# Patient Record
Sex: MALE | Race: WHITE | NOT HISPANIC OR LATINO | URBAN - METROPOLITAN AREA
[De-identification: names, ages, dates, MRNs, and addresses within clinical notes are randomized per-mention and may not be internally consistent; named-entity substitution may affect disease eponyms.]

---

## 2018-10-02 ENCOUNTER — EMERGENCY (EMERGENCY)
Facility: HOSPITAL | Age: 48
LOS: 1 days | Discharge: HOME | End: 2018-10-02
Attending: EMERGENCY MEDICINE
Payer: COMMERCIAL

## 2018-10-02 VITALS
RESPIRATION RATE: 16 BRPM | SYSTOLIC BLOOD PRESSURE: 122 MMHG | TEMPERATURE: 98 F | OXYGEN SATURATION: 96 % | DIASTOLIC BLOOD PRESSURE: 87 MMHG | HEART RATE: 103 BPM

## 2018-10-02 VITALS — WEIGHT: 240.08 LBS | HEIGHT: 71 IN

## 2018-10-02 LAB
ALBUMIN SERPL ELPH-MCNC: 4.1 G/DL — SIGNIFICANT CHANGE UP (ref 3.3–5.2)
ALP SERPL-CCNC: 130 U/L — HIGH (ref 40–120)
ALT FLD-CCNC: 15 U/L — SIGNIFICANT CHANGE UP
ANION GAP SERPL CALC-SCNC: 15 MMOL/L — SIGNIFICANT CHANGE UP (ref 5–17)
AST SERPL-CCNC: 13 U/L — SIGNIFICANT CHANGE UP
BASOPHILS # BLD AUTO: 0.1 K/UL — SIGNIFICANT CHANGE UP (ref 0–0.2)
BASOPHILS NFR BLD AUTO: 0.5 % — SIGNIFICANT CHANGE UP (ref 0–2)
BILIRUB SERPL-MCNC: 0.5 MG/DL — SIGNIFICANT CHANGE UP (ref 0.4–2)
BUN SERPL-MCNC: 14 MG/DL — SIGNIFICANT CHANGE UP (ref 8–20)
CALCIUM SERPL-MCNC: 10.7 MG/DL — HIGH (ref 8.6–10.2)
CHLORIDE SERPL-SCNC: 97 MMOL/L — LOW (ref 98–107)
CO2 SERPL-SCNC: 22 MMOL/L — SIGNIFICANT CHANGE UP (ref 22–29)
CREAT SERPL-MCNC: 0.75 MG/DL — SIGNIFICANT CHANGE UP (ref 0.5–1.3)
EOSINOPHIL # BLD AUTO: 1.3 K/UL — HIGH (ref 0–0.5)
EOSINOPHIL NFR BLD AUTO: 10.8 % — HIGH (ref 0–5)
GLUCOSE SERPL-MCNC: 350 MG/DL — HIGH (ref 70–115)
HCT VFR BLD CALC: 48.6 % — SIGNIFICANT CHANGE UP (ref 42–52)
HGB BLD-MCNC: 16 G/DL — SIGNIFICANT CHANGE UP (ref 14–18)
LYMPHOCYTES # BLD AUTO: 1.6 K/UL — SIGNIFICANT CHANGE UP (ref 1–4.8)
LYMPHOCYTES # BLD AUTO: 13.8 % — LOW (ref 20–55)
MCHC RBC-ENTMCNC: 27.1 PG — SIGNIFICANT CHANGE UP (ref 27–31)
MCHC RBC-ENTMCNC: 32.9 G/DL — SIGNIFICANT CHANGE UP (ref 32–36)
MCV RBC AUTO: 82.2 FL — SIGNIFICANT CHANGE UP (ref 80–94)
MONOCYTES # BLD AUTO: 0.9 K/UL — HIGH (ref 0–0.8)
MONOCYTES NFR BLD AUTO: 7.9 % — SIGNIFICANT CHANGE UP (ref 3–10)
NEUTROPHILS # BLD AUTO: 7.9 K/UL — SIGNIFICANT CHANGE UP (ref 1.8–8)
NEUTROPHILS NFR BLD AUTO: 66.7 % — SIGNIFICANT CHANGE UP (ref 37–73)
PLATELET # BLD AUTO: 205 K/UL — SIGNIFICANT CHANGE UP (ref 150–400)
POTASSIUM SERPL-MCNC: 4.4 MMOL/L — SIGNIFICANT CHANGE UP (ref 3.5–5.3)
POTASSIUM SERPL-SCNC: 4.4 MMOL/L — SIGNIFICANT CHANGE UP (ref 3.5–5.3)
PROCALCITONIN SERPL-MCNC: <0.05 NG/ML — SIGNIFICANT CHANGE UP (ref 0–0.04)
PROT SERPL-MCNC: 8.1 G/DL — SIGNIFICANT CHANGE UP (ref 6.6–8.7)
RBC # BLD: 5.91 M/UL — SIGNIFICANT CHANGE UP (ref 4.6–6.2)
RBC # FLD: 12.6 % — SIGNIFICANT CHANGE UP (ref 11–15.6)
SODIUM SERPL-SCNC: 134 MMOL/L — LOW (ref 135–145)
WBC # BLD: 11.9 K/UL — HIGH (ref 4.8–10.8)
WBC # FLD AUTO: 11.9 K/UL — HIGH (ref 4.8–10.8)

## 2018-10-02 PROCEDURE — 99285 EMERGENCY DEPT VISIT HI MDM: CPT

## 2018-10-02 PROCEDURE — 94640 AIRWAY INHALATION TREATMENT: CPT

## 2018-10-02 PROCEDURE — 84145 PROCALCITONIN (PCT): CPT

## 2018-10-02 PROCEDURE — 71046 X-RAY EXAM CHEST 2 VIEWS: CPT | Mod: 26

## 2018-10-02 PROCEDURE — 96374 THER/PROPH/DIAG INJ IV PUSH: CPT

## 2018-10-02 PROCEDURE — 80053 COMPREHEN METABOLIC PANEL: CPT

## 2018-10-02 PROCEDURE — 85027 COMPLETE CBC AUTOMATED: CPT

## 2018-10-02 PROCEDURE — 96375 TX/PRO/DX INJ NEW DRUG ADDON: CPT

## 2018-10-02 PROCEDURE — 71046 X-RAY EXAM CHEST 2 VIEWS: CPT

## 2018-10-02 PROCEDURE — 99284 EMERGENCY DEPT VISIT MOD MDM: CPT | Mod: 25

## 2018-10-02 PROCEDURE — 36415 COLL VENOUS BLD VENIPUNCTURE: CPT

## 2018-10-02 RX ORDER — ALBUTEROL 90 UG/1
2.5 AEROSOL, METERED ORAL
Qty: 0 | Refills: 0 | Status: DISCONTINUED | OUTPATIENT
Start: 2018-10-02 | End: 2018-10-07

## 2018-10-02 RX ORDER — CEFTRIAXONE 500 MG/1
1 INJECTION, POWDER, FOR SOLUTION INTRAMUSCULAR; INTRAVENOUS ONCE
Qty: 0 | Refills: 0 | Status: COMPLETED | OUTPATIENT
Start: 2018-10-02 | End: 2018-10-02

## 2018-10-02 RX ADMIN — CEFTRIAXONE 1 GRAM(S): 500 INJECTION, POWDER, FOR SOLUTION INTRAMUSCULAR; INTRAVENOUS at 17:30

## 2018-10-02 RX ADMIN — ALBUTEROL 2.5 MILLIGRAM(S): 90 AEROSOL, METERED ORAL at 17:51

## 2018-10-02 RX ADMIN — CEFTRIAXONE 100 GRAM(S): 500 INJECTION, POWDER, FOR SOLUTION INTRAMUSCULAR; INTRAVENOUS at 17:41

## 2018-10-02 RX ADMIN — Medication 125 MILLIGRAM(S): at 17:40

## 2018-10-02 RX ADMIN — ALBUTEROL 2.5 MILLIGRAM(S): 90 AEROSOL, METERED ORAL at 17:40

## 2018-10-02 NOTE — ED STATDOCS - PROGRESS NOTE DETAILS
PT evaluated by intake physician. HPI/PE/ROS as noted above. Will follow up plan per intake physician. results reviewed with Pt. rx steroids and augmentin. + pneumonia on cxr. Pt stable in ED, Pt states he "feels better." PT verbalized understanding of diagnosis and importance of follow up at [facility]. PT educated on importance of follow up and when to return to the ED.

## 2018-10-02 NOTE — ED STATDOCS - CARE PLAN
Principal Discharge DX:	Pneumonia Principal Discharge DX:	Pneumonia  Secondary Diagnosis:	Mild intermittent asthma, unspecified whether complicated

## 2018-10-02 NOTE — ED STATDOCS - PHYSICAL EXAMINATION
Constitutional: speaking in full sentences  HEENT: pink, moist membranes, tongue midline, no exudates  EYES: not injected  RESPIRATORY: lungs clear  CARDIAC: chest wall moves symmetrically, S1 S2, no pedal edema  GI: abdomen soft, non-tender, bowel sounds present  : no CVAT  MSK: spine is midline, no calf tenderness Constitutional: speaking in full sentences  HEENT: pink, moist membranes, tongue midline, no exudates  EYES: not injected  RESPIRATORY: lungs clear  CARDIAC: chest wall moves symmetrically, S1 S2, no pedal edema  GI: abdomen soft, non-tender, bowel sounds present  : no CVAT  MSK: spine is midline, no calf tenderness  skin = no; rash  Neuro = awake and alert, normal gait/balance

## 2018-10-02 NOTE — ED STATDOCS - MEDICAL DECISION MAKING DETAILS
URI symptoms, treated with z-pack x3 days ago, sent from [facility] due to infiltrate on x-ray, add abx to current regimen, and manage underlying asthma, check results, reassess.

## 2018-10-02 NOTE — ED STATDOCS - OBJECTIVE STATEMENT
47 y/o M pt with PMHx asthma, HLD, "slightly rapid heart beat", and DM (on metformin) presents to the ED c/o persistent URI symptoms for the past one week.  Pt states he recently lost his voice.  He also notes cough, congestion, sore throat.  He went to [facility] 2 days ago for these symptoms, was given a z-pack, but did not feel his symptoms improve.  Today he went back to [facility], had a CXR, and was told to come into the ED.  He took his z-pack dosage today.  Takes Symbicort PRN and ventolin daily.  Non-smoker.  Denies fever, chills, vomiting.  No further acute complaints at this time.  Pulmonologist: Dr. Garsia 49 y/o M pt with PMHx asthma, HLD, "slightly rapid heart beat", and DM (on metformin) presents to the ED c/o persistent URI symptoms for the past one week.  Pt states he recently lost his voice.  He also notes cough, congestion, sore throat.  He went to [facility] 2 days ago for these symptoms, was given a z-pack, but did not feel his symptoms improve.  Today he went back to [facility], had a CXR that showed an infiltrate, and was told to come into the ED.  He took his z-pack dosage today.  Takes Symbicort PRN and ventolin daily.  Non-smoker.  Denies fever, chills, vomiting.  No further acute complaints at this time.  Pulmonologist: Dr. Garsia

## 2018-10-02 NOTE — ED ADULT NURSE NOTE - NSIMPLEMENTINTERV_GEN_ALL_ED
Implemented All Universal Safety Interventions:  [place] to call system. Call bell, personal items and telephone within reach. Instruct patient to call for assistance. Room bathroom lighting operational. Non-slip footwear when patient is off stretcher. Physically safe environment: no spills, clutter or unnecessary equipment. Stretcher in lowest position, wheels locked, appropriate side rails in place.

## 2020-05-02 NOTE — ED STATDOCS - ATTENDING CONTRIBUTION TO CARE
No
I, Javed Odell, performed the initial face to face bedside interview with this patient regarding history of present illness, review of symptoms and relevant past medical, social and family history.  I completed an independent physical examination.  I was the initial provider who evaluated this patient. I have signed out the follow up of any pending tests (i.e. labs, radiological studies) to the ACP.  I have communicated the patient’s plan of care and disposition with the ACP.

## 2022-06-13 LAB
HBA1C MFR BLD HPLC: 11.1
LDLC SERPL DIRECT ASSAY-MCNC: 177

## 2022-06-14 ENCOUNTER — APPOINTMENT (OUTPATIENT)
Dept: ENDOCRINOLOGY | Facility: CLINIC | Age: 52
End: 2022-06-14
Payer: COMMERCIAL

## 2022-06-14 VITALS
WEIGHT: 230 LBS | SYSTOLIC BLOOD PRESSURE: 124 MMHG | BODY MASS INDEX: 32.2 KG/M2 | DIASTOLIC BLOOD PRESSURE: 82 MMHG | HEART RATE: 111 BPM | HEIGHT: 71 IN

## 2022-06-14 DIAGNOSIS — Z78.9 OTHER SPECIFIED HEALTH STATUS: ICD-10-CM

## 2022-06-14 DIAGNOSIS — F19.90 OTHER PSYCHOACTIVE SUBSTANCE USE, UNSPECIFIED, UNCOMPLICATED: ICD-10-CM

## 2022-06-14 DIAGNOSIS — Z83.3 FAMILY HISTORY OF DIABETES MELLITUS: ICD-10-CM

## 2022-06-14 DIAGNOSIS — Z86.39 PERSONAL HISTORY OF OTHER ENDOCRINE, NUTRITIONAL AND METABOLIC DISEASE: ICD-10-CM

## 2022-06-14 LAB — GLUCOSE BLDC GLUCOMTR-MCNC: 275

## 2022-06-14 PROCEDURE — 99072 ADDL SUPL MATRL&STAF TM PHE: CPT

## 2022-06-14 PROCEDURE — 99205 OFFICE O/P NEW HI 60 MIN: CPT | Mod: 25

## 2022-06-14 PROCEDURE — 98960 EDU&TRN PT SELF-MGMT NQHP 1: CPT

## 2022-06-14 PROCEDURE — 82962 GLUCOSE BLOOD TEST: CPT

## 2022-06-14 RX ORDER — METFORMIN HYDROCHLORIDE 1000 MG/1
1000 TABLET, COATED ORAL TWICE DAILY
Qty: 180 | Refills: 1 | Status: ACTIVE | COMMUNITY
Start: 2022-06-14 | End: 1900-01-01

## 2022-06-14 RX ORDER — INSULIN GLARGINE 100 [IU]/ML
100 INJECTION, SOLUTION SUBCUTANEOUS
Qty: 3 | Refills: 2 | Status: ACTIVE | COMMUNITY
Start: 2022-06-14 | End: 1900-01-01

## 2022-06-14 RX ORDER — ALBUTEROL SULFATE 90 UG/1
108 (90 BASE) INHALANT RESPIRATORY (INHALATION)
Refills: 0 | Status: ACTIVE | COMMUNITY

## 2022-06-14 NOTE — PHYSICAL EXAM
[Alert] : alert [No Acute Distress] : no acute distress [Well Developed] : well developed [EOMI] : extra ocular movement intact [PERRL] : pupils equal, round and reactive to light [Normal Outer Ear/Nose] : the ears and nose were normal in appearance [Normal Hearing] : hearing was normal [Thyroid Not Enlarged] : the thyroid was not enlarged [No Thyroid Nodules] : no palpable thyroid nodules [No Respiratory Distress] : no respiratory distress [Clear to Auscultation] : lungs were clear to auscultation bilaterally [Regular Rhythm] : with a regular rhythm [No Edema] : no peripheral edema [Pedal Pulses Normal] : the pedal pulses are present [Normal Bowel Sounds] : normal bowel sounds [Not Tender] : non-tender [Soft] : abdomen soft [Normal Supraclavicular Nodes] : no supraclavicular lymphadenopathy [Normal Anterior Cervical Nodes] : no anterior cervical lymphadenopathy [Normal Posterior Cervical Nodes] : no posterior cervical lymphadenopathy [Normal Gait] : normal gait [No Clubbing, Cyanosis] : no clubbing  or cyanosis of the fingernails [No Joint Swelling] : no joint swelling seen [No Rash] : no rash [No Skin Lesions] : no skin lesions [Acanthosis Nigricans] : no acanthosis nigricans [Normal] : normal [2+] : 2+ in the dorsalis pedis [Diminished Throughout Both Feet] : diminished tactile sensation with monofilament testing throughout both feet [No Tremors] : no tremors [Oriented x3] : oriented to person, place, and time [Normal Affect] : the affect was normal [Normal Insight/Judgement] : insight and judgment were intact [Normal Mood] : the mood was normal [de-identified] : Overweight  [de-identified] : Dry oral mucosa  [de-identified] : Mildly tachycardic

## 2022-06-14 NOTE — HISTORY OF PRESENT ILLNESS
[FreeTextEntry1] : 51 y.o. male with PMHx of DM and Asthma referred from pulmonology office for evaluation and management of DM. Patient was diagnosed with DM ~10 y.ago. On oral medications since (Metformin). Was on Trulicity shortly but did not have interest to refill it. Admits non compliance with medications and diet d/t busy lifestyle. Reports polyuria, polydipsia with significant neuropathy. \par \par Currently on:\par Metformin 1000 mg daily\par Rest of medications - Albuterol inhaler\par

## 2022-06-14 NOTE — ASSESSMENT
[Diabetes Foot Care] : diabetes foot care [Carbohydrate Consistent Diet] : carbohydrate consistent diet [Importance of Diet and Exercise] : importance of diet and exercise to improve glycemic control, achieve weight loss and improve cardiovascular health [Exercise/Effect on Glucose] : exercise/effect on glucose [FreeTextEntry1] : 51 y.o. male with PMHx of DM and Asthma referred from pulmonology office for evaluation and management of DM. Patient was diagnosed with DM ~10 y.ago. On oral medications since (Metformin). Was on Trulicity shortly but did not have interest to refill it. Admits non compliance with medications and diet d/t busy lifestyle. Reports polyuria, polydipsia with significant neuropathy. \par \par Currently on:\par Metformin 1000 mg daily\par Rest of medications - Albuterol inhaler\par \par SMBG: checks randomly - fasting >200\par \par # Severely uncontrolled long standing T2D\par A1C 11.1% (6/7/22)\par Secondary to non compliance and undertreatment.\par Discussed the importance of compliance and risks and consequenced of uncontrolled DM\par Will start basal insulin 15 units qhs (Tresiba sample given). Will receive training for administration today\par Instructed to check SMBG 3 x day and at bed time\par Increase Metformin to 1000 mg BID\par Discussed dietary and lifestyle modification\par Will refer to [facility]\par \par # Obesity\par Discussed wt los importance along with daily physical activities\par Discussed dietary and lifestyle modifications\par Advised to exercise 30 min at least 3 x week\par \par # HLD\par Non compliant with medications\par On Atorvastatin 10 mg daily but take only 2 x week\par Encouraged to start taking Atorvastatin qhs\par Advised low fat cholesterol diet\par \par # Diabetic Neuropathy\par Decreased sensation in both feet.\par Managed off medications\par Needs tighter DM control \par \par RTC in 1 months with repeated labs and log books\par \par

## 2022-07-06 ENCOUNTER — APPOINTMENT (OUTPATIENT)
Dept: ENDOCRINOLOGY | Facility: CLINIC | Age: 52
End: 2022-07-06
Payer: COMMERCIAL

## 2022-07-06 PROCEDURE — 99072 ADDL SUPL MATRL&STAF TM PHE: CPT

## 2022-07-06 PROCEDURE — G0108 DIAB MANAGE TRN  PER INDIV: CPT

## 2022-07-06 RX ORDER — METFORMIN HYDROCHLORIDE 500 MG/1
500 TABLET, COATED ORAL
Refills: 0 | Status: DISCONTINUED | COMMUNITY
End: 2022-07-06

## 2022-07-06 RX ORDER — PEN NEEDLE, DIABETIC 32GX 5/32"
32G X 4 MM NEEDLE, DISPOSABLE MISCELLANEOUS
Qty: 1 | Refills: 2 | Status: ACTIVE | COMMUNITY
Start: 2022-07-06 | End: 1900-01-01

## 2022-07-25 LAB
HBA1C MFR BLD HPLC: 9.6
LDLC SERPL DIRECT ASSAY-MCNC: 156
MICROALBUMIN/CREAT 24H UR-RTO: 8

## 2022-07-26 ENCOUNTER — APPOINTMENT (OUTPATIENT)
Dept: ENDOCRINOLOGY | Facility: CLINIC | Age: 52
End: 2022-07-26

## 2022-07-26 VITALS
WEIGHT: 229 LBS | HEART RATE: 106 BPM | DIASTOLIC BLOOD PRESSURE: 86 MMHG | OXYGEN SATURATION: 97 % | BODY MASS INDEX: 32.06 KG/M2 | SYSTOLIC BLOOD PRESSURE: 122 MMHG | HEIGHT: 71 IN

## 2022-07-26 DIAGNOSIS — E11.9 TYPE 2 DIABETES MELLITUS W/OUT COMPLICATIONS: ICD-10-CM

## 2022-07-26 DIAGNOSIS — E78.5 HYPERLIPIDEMIA, UNSPECIFIED: ICD-10-CM

## 2022-07-26 DIAGNOSIS — E66.9 OBESITY, UNSPECIFIED: ICD-10-CM

## 2022-07-26 DIAGNOSIS — E11.42 TYPE 2 DIABETES MELLITUS WITH DIABETIC POLYNEUROPATHY: ICD-10-CM

## 2022-07-26 LAB — GLUCOSE BLDC GLUCOMTR-MCNC: 159

## 2022-07-26 PROCEDURE — 99072 ADDL SUPL MATRL&STAF TM PHE: CPT

## 2022-07-26 PROCEDURE — 99215 OFFICE O/P EST HI 40 MIN: CPT | Mod: 25

## 2022-07-26 PROCEDURE — 82962 GLUCOSE BLOOD TEST: CPT

## 2022-07-26 RX ORDER — MONTELUKAST 10 MG/1
10 TABLET, FILM COATED ORAL
Qty: 90 | Refills: 0 | Status: ACTIVE | COMMUNITY
Start: 2022-02-11

## 2022-07-26 RX ORDER — SIMVASTATIN 20 MG/1
20 TABLET, FILM COATED ORAL
Qty: 90 | Refills: 0 | Status: ACTIVE | COMMUNITY
Start: 2022-03-14

## 2022-07-26 RX ORDER — ALBUTEROL SULFATE 2.5 MG/3ML
(2.5 MG/3ML) SOLUTION RESPIRATORY (INHALATION)
Qty: 75 | Refills: 0 | Status: ACTIVE | COMMUNITY
Start: 2022-04-20

## 2022-07-26 RX ORDER — GABAPENTIN 100 MG/1
100 CAPSULE ORAL 3 TIMES DAILY
Qty: 270 | Refills: 1 | Status: ACTIVE | COMMUNITY
Start: 2022-07-26 | End: 1900-01-01

## 2022-07-26 NOTE — PHYSICAL EXAM
[Alert] : alert [No Acute Distress] : no acute distress [Well Developed] : well developed [EOMI] : extra ocular movement intact [PERRL] : pupils equal, round and reactive to light [Normal Outer Ear/Nose] : the ears and nose were normal in appearance [Normal Hearing] : hearing was normal [Thyroid Not Enlarged] : the thyroid was not enlarged [No Thyroid Nodules] : no palpable thyroid nodules [No Respiratory Distress] : no respiratory distress [Clear to Auscultation] : lungs were clear to auscultation bilaterally [Regular Rhythm] : with a regular rhythm [No Edema] : no peripheral edema [Pedal Pulses Normal] : the pedal pulses are present [Normal Bowel Sounds] : normal bowel sounds [Not Tender] : non-tender [Soft] : abdomen soft [Normal Supraclavicular Nodes] : no supraclavicular lymphadenopathy [Normal Anterior Cervical Nodes] : no anterior cervical lymphadenopathy [Normal Gait] : normal gait [No Clubbing, Cyanosis] : no clubbing  or cyanosis of the fingernails [No Joint Swelling] : no joint swelling seen [No Rash] : no rash [No Skin Lesions] : no skin lesions [Normal] : normal [2+] : 2+ in the dorsalis pedis [Diminished Throughout Both Feet] : diminished tactile sensation with monofilament testing throughout both feet [No Tremors] : no tremors [Oriented x3] : oriented to person, place, and time [Normal Affect] : the affect was normal [Normal Insight/Judgement] : insight and judgment were intact [Normal Mood] : the mood was normal [Acanthosis Nigricans] : no acanthosis nigricans [de-identified] : Overweight  [de-identified] : Dry oral mucosa  [de-identified] : Mildly tachycardic

## 2022-07-26 NOTE — ASSESSMENT
[FreeTextEntry1] : 51 y.o. male with PMHx of DM and Asthma referred from pulmonology office for evaluation and management of DM. Patient was diagnosed with DM ~10 y.ago. On oral medications since (Metformin). Was on Trulicity shortly but did not have interest to refill it. Admits non compliance with medications and diet d/t busy lifestyle. Reports polyuria, polydipsia with significant neuropathy. \par \par Currently on:\par Basaglar 15U qhs\par Metformin 1000 mg BID\par \par \par SMBG: \par fasting 150 - 180\par During the day 130 - 150\par After meals >200\par \par # Long standing uncontrolled T2D\par Secondary to non compliance and undertreatment.\par Now BG more controlled. Still not at goal\par A1C trending down 9%<==11.1% (6/7/22)\par Continue Basaglar 15 units qhs\par Continue SMBG 3 x day at different times\par Continue Metformin 1000 mg BID\par Will start Ozempic 0.25 mg weekly for 4 weeks and increase to 0.5 mg weekly\par \par \par # Obesity\par Unable to loose much wt due to busy work schedule\par Discussed dietary and lifestyle modification again\par Advised to exercise 30 min at least 3 x week\par GLP-1 would help him additionally \par \par # HLD\par Non compliant with medications\par Will resume Simvastatin 20 mg qhs\par Advised low fat cholesterol diet\par \par # Diabetic Neuropathy\par Decreased sensation in both feet.\par Managed off medications\par Needs tighter DM control \par Will start Gabapentin 100 mg TID\par \par RTC in 3 months with repeated labs and log books\par \par  [Carbohydrate Consistent Diet] : carbohydrate consistent diet [Importance of Diet and Exercise] : importance of diet and exercise to improve glycemic control, achieve weight loss and improve cardiovascular health [Exercise/Effect on Glucose] : exercise/effect on glucose

## 2022-07-27 RX ORDER — SEMAGLUTIDE 1.34 MG/ML
2 INJECTION, SOLUTION SUBCUTANEOUS
Qty: 3 | Refills: 1 | Status: ACTIVE | COMMUNITY
Start: 2022-07-26

## 2022-10-18 ENCOUNTER — APPOINTMENT (OUTPATIENT)
Dept: ENDOCRINOLOGY | Facility: CLINIC | Age: 52
End: 2022-10-18

## 2022-12-27 ENCOUNTER — APPOINTMENT (OUTPATIENT)
Dept: ENDOCRINOLOGY | Facility: CLINIC | Age: 52
End: 2022-12-27

## 2024-08-17 ENCOUNTER — OFFICE (OUTPATIENT)
Dept: URBAN - METROPOLITAN AREA CLINIC 94 | Facility: CLINIC | Age: 54
Setting detail: OPHTHALMOLOGY
End: 2024-08-17
Payer: COMMERCIAL

## 2024-08-17 ENCOUNTER — RX ONLY (RX ONLY)
Age: 54
End: 2024-08-17

## 2024-08-17 DIAGNOSIS — E11.3311: ICD-10-CM

## 2024-08-17 DIAGNOSIS — E11.3313: ICD-10-CM

## 2024-08-17 DIAGNOSIS — E11.3312: ICD-10-CM

## 2024-08-17 PROCEDURE — 92004 COMPRE OPH EXAM NEW PT 1/>: CPT | Performed by: OPHTHALMOLOGY

## 2024-08-17 PROCEDURE — 92235 FLUORESCEIN ANGRPH MLTIFRAME: CPT | Performed by: OPHTHALMOLOGY

## 2024-08-17 PROCEDURE — 92134 CPTRZ OPH DX IMG PST SGM RTA: CPT | Performed by: OPHTHALMOLOGY

## 2024-08-17 PROCEDURE — 67028 INJECTION EYE DRUG: CPT | Mod: 50 | Performed by: OPHTHALMOLOGY

## 2024-08-17 ASSESSMENT — CONFRONTATIONAL VISUAL FIELD TEST (CVF)
OD_FINDINGS: FULL
OS_FINDINGS: FULL

## 2024-09-17 ENCOUNTER — OFFICE (OUTPATIENT)
Dept: URBAN - METROPOLITAN AREA CLINIC 94 | Facility: CLINIC | Age: 54
Setting detail: OPHTHALMOLOGY
End: 2024-09-17
Payer: COMMERCIAL

## 2024-09-17 DIAGNOSIS — E11.3313: ICD-10-CM

## 2024-09-17 DIAGNOSIS — E11.3311: ICD-10-CM

## 2024-09-17 PROCEDURE — 92134 CPTRZ OPH DX IMG PST SGM RTA: CPT | Performed by: OPHTHALMOLOGY

## 2024-09-17 PROCEDURE — 67210 TREATMENT OF RETINAL LESION: CPT | Mod: RT | Performed by: OPHTHALMOLOGY

## 2024-09-17 ASSESSMENT — CONFRONTATIONAL VISUAL FIELD TEST (CVF)
OS_FINDINGS: FULL
OD_FINDINGS: FULL

## 2024-09-24 ENCOUNTER — OFFICE (OUTPATIENT)
Dept: URBAN - METROPOLITAN AREA CLINIC 94 | Facility: CLINIC | Age: 54
Setting detail: OPHTHALMOLOGY
End: 2024-09-24
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
## 2024-09-24 PROCEDURE — NO SHOW FE NO SHOW FEE: Performed by: OPHTHALMOLOGY

## 2024-10-01 ENCOUNTER — OFFICE (OUTPATIENT)
Dept: URBAN - METROPOLITAN AREA CLINIC 94 | Facility: CLINIC | Age: 54
Setting detail: OPHTHALMOLOGY
End: 2024-10-01
Payer: COMMERCIAL

## 2024-10-01 DIAGNOSIS — E11.3312: ICD-10-CM

## 2024-10-01 PROCEDURE — 67210 TREATMENT OF RETINAL LESION: CPT | Mod: 79,LT | Performed by: OPHTHALMOLOGY

## 2024-10-01 ASSESSMENT — TONOMETRY
OD_IOP_MMHG: 17
OS_IOP_MMHG: 15

## 2024-10-01 ASSESSMENT — CONFRONTATIONAL VISUAL FIELD TEST (CVF)
OS_FINDINGS: FULL
OD_FINDINGS: FULL

## 2024-10-01 ASSESSMENT — VISUAL ACUITY
OS_BCVA: 20/25
OD_BCVA: 20/25+

## 2024-10-16 ENCOUNTER — OFFICE (OUTPATIENT)
Dept: URBAN - METROPOLITAN AREA CLINIC 105 | Facility: CLINIC | Age: 54
Setting detail: OPHTHALMOLOGY
End: 2024-10-16
Payer: COMMERCIAL

## 2024-10-16 DIAGNOSIS — E11.3313: ICD-10-CM

## 2024-10-16 PROCEDURE — 67028 INJECTION EYE DRUG: CPT | Mod: 58,50 | Performed by: OPHTHALMOLOGY

## 2024-10-16 PROCEDURE — 92134 CPTRZ OPH DX IMG PST SGM RTA: CPT | Performed by: OPHTHALMOLOGY

## 2024-10-16 ASSESSMENT — CONFRONTATIONAL VISUAL FIELD TEST (CVF)
OD_FINDINGS: FULL
OS_FINDINGS: FULL

## 2024-10-16 ASSESSMENT — TONOMETRY
OS_IOP_MMHG: 17
OD_IOP_MMHG: 17

## 2024-10-17 ASSESSMENT — VISUAL ACUITY
OD_BCVA: 20/20
OS_BCVA: 20/20-2

## 2024-11-22 ENCOUNTER — OFFICE (OUTPATIENT)
Dept: URBAN - METROPOLITAN AREA CLINIC 105 | Facility: CLINIC | Age: 54
Setting detail: OPHTHALMOLOGY
End: 2024-11-22
Payer: COMMERCIAL

## 2024-11-22 DIAGNOSIS — E11.3313: ICD-10-CM

## 2024-11-22 PROCEDURE — 67028 INJECTION EYE DRUG: CPT | Mod: 58,50 | Performed by: OPHTHALMOLOGY

## 2024-11-22 PROCEDURE — 92134 CPTRZ OPH DX IMG PST SGM RTA: CPT | Performed by: OPHTHALMOLOGY

## 2024-11-22 ASSESSMENT — CONFRONTATIONAL VISUAL FIELD TEST (CVF)
OD_FINDINGS: FULL
OS_FINDINGS: FULL

## 2024-11-22 ASSESSMENT — TONOMETRY
OD_IOP_MMHG: 17
OS_IOP_MMHG: 19

## 2024-11-22 ASSESSMENT — VISUAL ACUITY
OD_BCVA: 20/30
OS_BCVA: 20/30-2